# Patient Record
Sex: MALE
[De-identification: names, ages, dates, MRNs, and addresses within clinical notes are randomized per-mention and may not be internally consistent; named-entity substitution may affect disease eponyms.]

---

## 2018-05-24 ENCOUNTER — HOSPITAL ENCOUNTER (OUTPATIENT)
Dept: HOSPITAL 5 - CATHLABREC | Age: 58
Discharge: HOME | End: 2018-05-24
Attending: INTERNAL MEDICINE
Payer: COMMERCIAL

## 2018-05-24 VITALS — SYSTOLIC BLOOD PRESSURE: 117 MMHG | DIASTOLIC BLOOD PRESSURE: 77 MMHG

## 2018-05-24 DIAGNOSIS — I34.0: Primary | ICD-10-CM

## 2018-05-24 DIAGNOSIS — F32.9: ICD-10-CM

## 2018-05-24 DIAGNOSIS — Z79.01: ICD-10-CM

## 2018-05-24 LAB
BASOPHILS # (AUTO): 0.1 K/MM3 (ref 0–0.1)
BASOPHILS NFR BLD AUTO: 0.8 % (ref 0–1.8)
BUN SERPL-MCNC: 12 MG/DL (ref 9–20)
BUN/CREAT SERPL: 12 %
CALCIUM SERPL-MCNC: 9.3 MG/DL (ref 8.4–10.2)
EOSINOPHIL # BLD AUTO: 0.2 K/MM3 (ref 0–0.4)
EOSINOPHIL NFR BLD AUTO: 3.6 % (ref 0–4.3)
HCT VFR BLD CALC: 37.9 % (ref 35.5–45.6)
HEMOLYSIS INDEX: 4
HGB BLD-MCNC: 13.3 GM/DL (ref 11.8–15.2)
INR PPP: 0.96 (ref 0.87–1.13)
LYMPHOCYTES # BLD AUTO: 1.8 K/MM3 (ref 1.2–5.4)
LYMPHOCYTES NFR BLD AUTO: 27.6 % (ref 13.4–35)
MCH RBC QN AUTO: 32 PG (ref 28–32)
MCHC RBC AUTO-ENTMCNC: 35 % (ref 32–34)
MCV RBC AUTO: 90 FL (ref 84–94)
MONOCYTES # (AUTO): 0.6 K/MM3 (ref 0–0.8)
MONOCYTES % (AUTO): 9.2 % (ref 0–7.3)
PLATELET # BLD: 181 K/MM3 (ref 140–440)
RBC # BLD AUTO: 4.2 M/MM3 (ref 3.65–5.03)

## 2018-05-24 PROCEDURE — 85730 THROMBOPLASTIN TIME PARTIAL: CPT

## 2018-05-24 PROCEDURE — C1894 INTRO/SHEATH, NON-LASER: HCPCS

## 2018-05-24 PROCEDURE — 85610 PROTHROMBIN TIME: CPT

## 2018-05-24 PROCEDURE — 99156 MOD SED OTH PHYS/QHP 5/>YRS: CPT

## 2018-05-24 PROCEDURE — 99157 MOD SED OTHER PHYS/QHP EA: CPT

## 2018-05-24 PROCEDURE — 85025 COMPLETE CBC W/AUTO DIFF WBC: CPT

## 2018-05-24 PROCEDURE — 80048 BASIC METABOLIC PNL TOTAL CA: CPT

## 2018-05-24 PROCEDURE — 93460 R&L HRT ART/VENTRICLE ANGIO: CPT

## 2018-05-24 PROCEDURE — 36415 COLL VENOUS BLD VENIPUNCTURE: CPT

## 2018-05-24 PROCEDURE — 93010 ELECTROCARDIOGRAM REPORT: CPT

## 2018-05-24 PROCEDURE — 93005 ELECTROCARDIOGRAM TRACING: CPT

## 2018-05-24 NOTE — CARDIAC CATHERIZATION REPORT
HISTORY:  The patient is a 57-year-old male who was found to have severe mitral

regurgitation on echocardiography.  Coronary angiography was recommended prior

to mitral valve repair or replacement.



PROCEDURES:  Left heart catheterization, ventriculography, coronary angiography

and aortography via the right femoral artery using 5-Kenyan Mary catheters

and pigtail catheter.  A 7-Kenyan [place]-Maci catheter was used for a right heart

catheterization via the right femoral vein.  In addition to angiography,

pressures and saturations were obtained.



COMPLICATIONS:  None.



TISSUE SAMPLE:  None.



SEDATION:  Intravenous Versed and fentanyl.



ESTIMATED BLOOD LOSS:  10-20 mL.



PREPROCEDURE DIAGNOSIS:  Severe mitral regurgitation.



POSTPROCEDURE DIAGNOSES:  Severe mitral regurgitation, single vessel coronary

artery disease, and mild pulmonary artery hypertension.



PRESSURES:  Central aortic pressure 104/69, left ventricular pressure 104/23,

mean right atrial pressure 11, right ventricular pressure 42/13.  Pulmonary

artery pressure mean of 34.  Pulmonary capillary wedge pressure 24 with a V wave

of approximately 10 mmHg.  Cardiac index 2.23.  Cardiac output 4.5.  Pulmonary

artery pressure 47/23.  Saturations, SVC 57, pulmonary artery 64, aorta 90.



ANGIOGRAPHIC RESULTS:

1.  Left ventricle:  Left ventriculogram reveals enlargement of the left

ventricle with normal systolic function.  The ejection fraction is approximately

60%.  There is severe mitral regurgitation.

2.  Right coronary artery:  This is a dominant vessel.  There are no significant

lesions.  There are minimal atherosclerotic changes.

3.  Left coronary artery.  The left main and circumflex are free of disease. 

The LAD is remarkable for a 70% stenosis in the proximal portion.  The mid

portion of the LAD is calcified, irregular, and is free of significant lesions. 

The ostium of the first diagonal branch, which is a large caliber vessel,

appears to be stenosed by approximately 10%.

4.  The aortic root injection demonstrates a normal aortic root and trileaflet

aortic valve without regurgitation.



FINAL IMPRESSION:

1.  Severe mitral regurgitation with normal left ventricular function, mild left

ventricular enlargement, and mild pulmonary artery hypertension.

2.  Single vessel coronary artery disease with a proximal LAD lesion of

approximately 70%.



PLAN:  Referred to Cardiovascular Surgery, CAD risk factor modification, office

followup within 7 days.





DD: 05/24/2018 08:41

DT: 05/24/2018 09:46

JOB# [id number]  [id number]

BEKAH/NTS

## 2018-05-24 NOTE — DISCHARGE SUMMARY
Short Stay Discharge Plan


Activity: advance as tolerated


Weight Bearing Status: Full Weight Bearing


Diet: low fat, low cholesterol, low salt


Special Instructions: no heavy lifting


Follow up with: 


KALI LEDESMA MD [Primary Care Provider] - 7 Days


MARIA A WHYTE MD [Staff Physician] - 7 Days